# Patient Record
Sex: MALE
[De-identification: names, ages, dates, MRNs, and addresses within clinical notes are randomized per-mention and may not be internally consistent; named-entity substitution may affect disease eponyms.]

---
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
## 2021-01-07 ENCOUNTER — APPOINTMENT (OUTPATIENT)
Dept: PULMONOLOGY | Facility: CLINIC | Age: 21
End: 2021-01-07
Payer: MEDICAID

## 2021-01-07 VITALS — BODY MASS INDEX: 20.33 KG/M2 | HEIGHT: 70 IN | WEIGHT: 142 LBS

## 2021-01-07 DIAGNOSIS — Z82.49 FAMILY HISTORY OF ISCHEMIC HEART DISEASE AND OTHER DISEASES OF THE CIRCULATORY SYSTEM: ICD-10-CM

## 2021-01-07 DIAGNOSIS — S99.921A UNSPECIFIED INJURY OF RIGHT FOOT, INITIAL ENCOUNTER: ICD-10-CM

## 2021-01-07 PROCEDURE — 99204 OFFICE O/P NEW MOD 45 MIN: CPT | Mod: 95

## 2021-01-07 RX ORDER — B-COMPLEX WITH VITAMIN C
TABLET ORAL
Refills: 0 | Status: DISCONTINUED | COMMUNITY
End: 2021-01-07

## 2021-01-07 RX ORDER — MULTIVITAMIN
TABLET ORAL
Refills: 0 | Status: ACTIVE | COMMUNITY

## 2021-01-07 NOTE — ADDENDUM
[FreeTextEntry1] : Documented by Pablito Baker acting as a scribe for Dr. Pedro Lopez on 01/07/2021.\par \par All medical record entries made by the Scribe were at my, Dr. Pedro Lopez's, direction and personally dictated by me on 01/07/2021 . I have reviewed the chart and agree that the record accurately reflects my personal performance of the history, physical exam, assessment and plan. I have also personally directed, reviewed, and agree with the discharge instructions. \par

## 2021-01-07 NOTE — HISTORY OF PRESENT ILLNESS
[Home] : at home, [unfilled] , at the time of the visit. [Medical Office: ([facility])___] : at the medical office located in  [Verbal consent obtained from patient] : the patient, [unfilled] [TextBox_4] : Mr. MASTERS is a 20 year old male video calling to the office today for initial pulmonary evaluation. His chief complaint is\par \par -he notes cough and wheeze upon exertion in cold weather onset 1 year ago 2019 with recovery time of 20 minutes\par -he notes two similar exacerbations in the summer 2019\par -he notes repeat occurance this summer 2020 two times\par -he notes height 5'10", weight 142 lbs\par -he notes intermittent chest tightness residual to exertion\par -he notes Sx alleviated during indoors training\par -he notes sleeping well\par -he denies snore\par -he notes PND active in winter\par -he denies leaky, drippy, congested sinuses\par -he notes hay allergies\par -he notes regular bowel movements \par -he denies dysphonia\par -he denies nocturia\par -he denies dysuria \par -he notes visual issues with left eye\par -he denies any chest pain, chest pressure, diarrhea, constipation, dysphagia, dizziness, leg swelling, leg pain, itchy eyes, itchy ears, heartburn, reflux, sour taste in the mouth, myalgias or arthralgias.

## 2021-01-07 NOTE — ASSESSMENT
[FreeTextEntry1] : Mr. MASTERS is a 20 year old male with a history of nonsmoker, college student, no extenuating health conditions  who now video calls to the office for an initial pulmonary evaluation. SOB, mild Cough\par \par His shortness of breath is multifactorial due to:\par -poor mechanics of breathing \par -out of shape\par -pulmonary disease\par -asthma, allergies\par -?cardiac disease \par \par problem 1: exercise induced mild intermittent asthma \par -add Symbicort (160) 2 inhalations BID \par -add Ventolin 2 puffs Q6H, pre-exercise\par -recommended to increase hydration \par Asthma is believed to be caused by inherited (genetic) and environmental factor, but its exact cause is unknown. Asthma may be triggered by allergens, lung infections, or irritants in the air. Asthma triggers are different for each person \par -Inhaler technique reviewed as well as oral hygiene techniques reviewed with patient. Avoidance of cold air, extremes of temperature, rescue inhaler should be used before exercise. Order of medication reviewed with patient. Recommended use of a cool mist humidifier in the bedroom. \par \par Problem 2: ?Allergies \par -get Blood work to include: asthma panel, food IgE panel, IgE level, eosinophil level, vitamin D level \par -Add Olopatadine 0.6% at 1 sniff/nostril BID \par -recommended Xlear Nasal Saline \par Environmental measures for allergies were encouraged including mattress and pillow covers, air purifier, and environmental controls. \par \par problem 3: cardiac disease (no evidence) \par -recommended to continue to follow up with Cardiologist \par \par problem 4: poor breathing mechanics\par -Proper breathing techniques were reviewed with an emphasis of exhalation. Patient instructed to breath in for 1 second and out for four seconds. Patient was encouraged to not talk while walking. \par \par problem 5: health maintenance \par -recommended yearly flu shot after October 15\par -recommended strep pneumonia vaccines: Prevnar-13 vaccine, followed by Pneumo vaccine 23 one year following after 65\par -recommended early intervention for Upper Respiratory Infections (URIs)\par -recommended regular osteoporosis evaluations\par -recommended early dermatological evaluations\par -recommended after the age of 50 to the age of 70, colonoscopy every 5 years\par \par F/U in 6-8 weeks.\par He is encouraged to call with any changes, concerns, or questions\par

## 2021-01-14 ENCOUNTER — LABORATORY RESULT (OUTPATIENT)
Age: 21
End: 2021-01-14

## 2021-01-15 LAB
24R-OH-CALCIDIOL SERPL-MCNC: 64 PG/ML
25(OH)D3 SERPL-MCNC: 40.5 NG/ML
BASOPHILS # BLD AUTO: 0.04 K/UL
BASOPHILS NFR BLD AUTO: 0.7 %
EOSINOPHIL # BLD AUTO: 0.79 K/UL
EOSINOPHIL NFR BLD AUTO: 13.2 %
HCT VFR BLD CALC: 45.9 %
HGB BLD-MCNC: 14.8 G/DL
IMM GRANULOCYTES NFR BLD AUTO: 0.2 %
LYMPHOCYTES # BLD AUTO: 2.68 K/UL
LYMPHOCYTES NFR BLD AUTO: 44.7 %
MAN DIFF?: NORMAL
MCHC RBC-ENTMCNC: 29 PG
MCHC RBC-ENTMCNC: 32.2 GM/DL
MCV RBC AUTO: 90 FL
MONOCYTES # BLD AUTO: 0.61 K/UL
MONOCYTES NFR BLD AUTO: 10.2 %
NEUTROPHILS # BLD AUTO: 1.86 K/UL
NEUTROPHILS NFR BLD AUTO: 31 %
PLATELET # BLD AUTO: 233 K/UL
RBC # BLD: 5.1 M/UL
RBC # FLD: 11.8 %
WBC # FLD AUTO: 5.99 K/UL

## 2021-01-19 LAB — TOTAL IGE SMQN RAST: 434 KU/L

## 2021-01-20 LAB
A ALTERNATA IGE QN: 0.21 KUA/L
A FUMIGATUS IGE QN: <0.1 KUA/L
C ALBICANS IGE QN: <0.1 KUA/L
C HERBARUM IGE QN: <0.1 KUA/L
CAT DANDER IGE QN: 9.32 KUA/L
CLAM IGE QN: <0.1 KUA/L
CODFISH IGE QN: <0.1 KUA/L
COMMON RAGWEED IGE QN: 0.13 KUA/L
CORN IGE QN: <0.1 KUA/L
COW MILK IGE QN: 0.28 KUA/L
D FARINAE IGE QN: 46 KUA/L
D PTERONYSS IGE QN: 31.3 KUA/L
DEPRECATED A ALTERNATA IGE RAST QL: NORMAL
DEPRECATED A FUMIGATUS IGE RAST QL: 0
DEPRECATED C ALBICANS IGE RAST QL: 0
DEPRECATED C HERBARUM IGE RAST QL: 0
DEPRECATED CAT DANDER IGE RAST QL: 3
DEPRECATED CLAM IGE RAST QL: 0
DEPRECATED CODFISH IGE RAST QL: 0
DEPRECATED COMMON RAGWEED IGE RAST QL: NORMAL
DEPRECATED CORN IGE RAST QL: 0
DEPRECATED COW MILK IGE RAST QL: NORMAL
DEPRECATED D FARINAE IGE RAST QL: 4
DEPRECATED D PTERONYSS IGE RAST QL: 4
DEPRECATED DOG DANDER IGE RAST QL: 4
DEPRECATED EGG WHITE IGE RAST QL: NORMAL
DEPRECATED M RACEMOSUS IGE RAST QL: 0
DEPRECATED PEANUT IGE RAST QL: 0
DEPRECATED ROACH IGE RAST QL: 0
DEPRECATED SCALLOP IGE RAST QL: 0.44 KUA/L
DEPRECATED SESAME SEED IGE RAST QL: 0
DEPRECATED SHRIMP IGE RAST QL: 0
DEPRECATED SOYBEAN IGE RAST QL: 0
DEPRECATED TIMOTHY IGE RAST QL: 0
DEPRECATED WALNUT IGE RAST QL: 0
DEPRECATED WHEAT IGE RAST QL: 0
DEPRECATED WHITE OAK IGE RAST QL: NORMAL
DOG DANDER IGE QN: 26.9 KUA/L
EGG WHITE IGE QN: 0.27 KUA/L
M RACEMOSUS IGE QN: <0.1 KUA/L
PEANUT IGE QN: <0.1 KUA/L
ROACH IGE QN: <0.1 KUA/L
SCALLOP IGE QN: 1
SCALLOP IGE QN: <0.1 KUA/L
SESAME SEED IGE QN: <0.1 KUA/L
SOYBEAN IGE QN: <0.1 KUA/L
TIMOTHY IGE QN: <0.1 KUA/L
WALNUT IGE QN: <0.1 KUA/L
WHEAT IGE QN: <0.1 KUA/L
WHITE OAK IGE QN: 0.13 KUA/L

## 2021-01-22 RX ORDER — OLOPATADINE HYDROCHLORIDE 665 UG/1
0.6 SPRAY, METERED NASAL
Qty: 3 | Refills: 1 | Status: DISCONTINUED | COMMUNITY
Start: 2021-01-07 | End: 2021-01-22

## 2021-01-22 RX ORDER — AZELASTINE HYDROCHLORIDE 137 UG/1
0.1 SPRAY, METERED NASAL TWICE DAILY
Qty: 3 | Refills: 1 | Status: ACTIVE | COMMUNITY
Start: 2021-01-22 | End: 1900-01-01

## 2021-02-03 RX ORDER — ALBUTEROL SULFATE 90 UG/1
108 (90 BASE) AEROSOL, METERED RESPIRATORY (INHALATION) EVERY 6 HOURS
Qty: 3 | Refills: 1 | Status: ACTIVE | COMMUNITY
Start: 2021-02-03 | End: 1900-01-01

## 2021-02-03 RX ORDER — BUDESONIDE AND FORMOTEROL FUMARATE DIHYDRATE 160; 4.5 UG/1; UG/1
160-4.5 AEROSOL RESPIRATORY (INHALATION) TWICE DAILY
Qty: 3 | Refills: 1 | Status: ACTIVE | COMMUNITY
Start: 2021-01-07 | End: 1900-01-01

## 2021-05-26 ENCOUNTER — APPOINTMENT (OUTPATIENT)
Dept: PULMONOLOGY | Facility: CLINIC | Age: 21
End: 2021-05-26
Payer: MEDICAID

## 2021-05-26 VITALS — WEIGHT: 144 LBS | BODY MASS INDEX: 20.62 KG/M2 | HEIGHT: 70 IN

## 2021-05-26 DIAGNOSIS — R06.02 SHORTNESS OF BREATH: ICD-10-CM

## 2021-05-26 DIAGNOSIS — J45.909 UNSPECIFIED ASTHMA, UNCOMPLICATED: ICD-10-CM

## 2021-05-26 DIAGNOSIS — J30.9 ALLERGIC RHINITIS, UNSPECIFIED: ICD-10-CM

## 2021-05-26 DIAGNOSIS — R76.8 OTHER SPECIFIED ABNORMAL IMMUNOLOGICAL FINDINGS IN SERUM: ICD-10-CM

## 2021-05-26 DIAGNOSIS — J82.83 EOSINOPHILIC ASTHMA: ICD-10-CM

## 2021-05-26 PROCEDURE — 99214 OFFICE O/P EST MOD 30 MIN: CPT | Mod: 95

## 2021-05-26 NOTE — ADDENDUM
[FreeTextEntry1] : Documented by Pablito Baker acting as a scribe for Dr. Pedro Lopez on 05/26/2021.\par \par All medical record entries made by the Scribe were at my, Dr. Pedro Lopez's, direction and personally dictated by me on 05/26/2021 . I have reviewed the chart and agree that the record accurately reflects my personal performance of the history, physical exam, assessment and plan. I have also personally directed, reviewed, and agree with the discharge instructions. \par

## 2021-05-26 NOTE — HISTORY OF PRESENT ILLNESS
[Home] : at home, [unfilled] , at the time of the visit. [Medical Office: ([facility])___] : at the medical office located in  [Verbal consent obtained from patient] : the patient, [unfilled] [TextBox_4] : Mr. MASTERS is a 20 year old male video calling to the office today for pulmonary follow up. 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

## 2021-05-26 NOTE — ASSESSMENT
[FreeTextEntry1] : Mr. MASTERS is a 20 year old male with a history of nonsmoker, college student, no extenuating health conditions  who now video calls to the office for an initial pulmonary evaluation. SOB, likely c/w allergies\par \par His shortness of breath is multifactorial due to:\par -poor mechanics of breathing \par -out of shape\par -pulmonary disease\par -asthma, allergies\par -?cardiac disease \par \par problem 1: exercise induced mild intermittent asthma \par -continue Symbicort (160) 2 inhalations BID \par -continue Ventolin 2 puffs Q6H, pre-exercise\par -recommended to increase hydration \par Asthma is believed to be caused by inherited (genetic) and environmental factor, but its exact cause is unknown. Asthma may be triggered by allergens, lung infections, or irritants in the air. Asthma triggers are different for each person \par -Inhaler technique reviewed as well as oral hygiene techniques reviewed with patient. Avoidance of cold air, extremes of temperature, rescue inhaler should be used before exercise. Order of medication reviewed with patient. Recommended use of a cool mist humidifier in the bedroom. \par \par Problem 1A: Eosinophilic Asthma\par -Candidate for Biologics (Fasenra, Nucala, Dupixent) \par \par problem 1B: Elevated IgE\par -candidate for xolair \par \par Problem 2: (+) Allergies \par -s/p Blood work to include: (+) asthma panel, (+) food IgE panel, (+) IgE level, (+) eosinophil level, vitamin D level \par -continue Olopatadine 0.6% at 1 sniff/nostril BID \par -add Zyrtec 5 mg QHS\par -recommended Alertec 10 mg QHS \par -recommended Xlear Nasal Saline \par Environmental measures for allergies were encouraged including mattress and pillow covers, air purifier, and environmental controls. \par \par problem 3: cardiac disease (no evidence) \par -recommended to continue to follow up with Cardiologist (doubtful) \par \par problem 4: poor breathing mechanics\par -Proper breathing techniques were reviewed with an emphasis of exhalation. Patient instructed to breath in for 1 second and out for four seconds. Patient was encouraged to not talk while walking. \par \par problem 5: health maintenance \par -COVID 19 vaccine pending\par -recommended yearly flu shot after October 15\par -recommended strep pneumonia vaccines: Prevnar-13 vaccine, followed by Pneumo vaccine 23 one year following after 65\par -recommended early intervention for Upper Respiratory Infections (URIs)\par -recommended regular osteoporosis evaluations\par -recommended early dermatological evaluations\par -recommended after the age of 50 to the age of 70, colonoscopy every 5 years\par \par F/U in 6-8 weeks.\par He is encouraged to call with any changes, concerns, or questions\par

## 2021-08-03 ENCOUNTER — APPOINTMENT (OUTPATIENT)
Dept: ORTHOPEDIC SURGERY | Facility: CLINIC | Age: 21
End: 2021-08-03